# Patient Record
Sex: FEMALE | Race: WHITE | Employment: UNEMPLOYED | URBAN - METROPOLITAN AREA
[De-identification: names, ages, dates, MRNs, and addresses within clinical notes are randomized per-mention and may not be internally consistent; named-entity substitution may affect disease eponyms.]

---

## 2023-09-24 SDOH — HEALTH STABILITY: PHYSICAL HEALTH: ON AVERAGE, HOW MANY DAYS PER WEEK DO YOU ENGAGE IN MODERATE TO STRENUOUS EXERCISE (LIKE A BRISK WALK)?: 1 DAY

## 2023-09-24 SDOH — HEALTH STABILITY: PHYSICAL HEALTH: ON AVERAGE, HOW MANY MINUTES DO YOU ENGAGE IN EXERCISE AT THIS LEVEL?: 40 MIN

## 2023-09-24 ASSESSMENT — SOCIAL DETERMINANTS OF HEALTH (SDOH)
WITHIN THE LAST YEAR, HAVE YOU BEEN AFRAID OF YOUR PARTNER OR EX-PARTNER?: NO
WITHIN THE LAST YEAR, HAVE YOU BEEN KICKED, HIT, SLAPPED, OR OTHERWISE PHYSICALLY HURT BY YOUR PARTNER OR EX-PARTNER?: NO
WITHIN THE LAST YEAR, HAVE YOU BEEN HUMILIATED OR EMOTIONALLY ABUSED IN OTHER WAYS BY YOUR PARTNER OR EX-PARTNER?: NO
WITHIN THE LAST YEAR, HAVE TO BEEN RAPED OR FORCED TO HAVE ANY KIND OF SEXUAL ACTIVITY BY YOUR PARTNER OR EX-PARTNER?: NO

## 2023-09-27 ENCOUNTER — OFFICE VISIT (OUTPATIENT)
Dept: PRIMARY CARE CLINIC | Age: 38
End: 2023-09-27
Payer: OTHER GOVERNMENT

## 2023-09-27 VITALS
SYSTOLIC BLOOD PRESSURE: 122 MMHG | HEIGHT: 64 IN | OXYGEN SATURATION: 99 % | WEIGHT: 138 LBS | HEART RATE: 69 BPM | DIASTOLIC BLOOD PRESSURE: 80 MMHG | BODY MASS INDEX: 23.56 KG/M2 | RESPIRATION RATE: 16 BRPM

## 2023-09-27 DIAGNOSIS — N80.9 ENDOMETRIOSIS DETERMINED BY LAPAROSCOPY: ICD-10-CM

## 2023-09-27 DIAGNOSIS — Z00.00 ANNUAL PHYSICAL EXAM: Primary | ICD-10-CM

## 2023-09-27 DIAGNOSIS — Z12.4 SCREENING FOR CERVICAL CANCER: ICD-10-CM

## 2023-09-27 PROBLEM — D25.0 FIBROIDS, SUBMUCOSAL: Status: ACTIVE | Noted: 2023-09-27

## 2023-09-27 PROCEDURE — 99385 PREV VISIT NEW AGE 18-39: CPT | Performed by: PHYSICIAN ASSISTANT

## 2023-09-27 SDOH — ECONOMIC STABILITY: FOOD INSECURITY: WITHIN THE PAST 12 MONTHS, YOU WORRIED THAT YOUR FOOD WOULD RUN OUT BEFORE YOU GOT MONEY TO BUY MORE.: NEVER TRUE

## 2023-09-27 SDOH — ECONOMIC STABILITY: INCOME INSECURITY: HOW HARD IS IT FOR YOU TO PAY FOR THE VERY BASICS LIKE FOOD, HOUSING, MEDICAL CARE, AND HEATING?: NOT HARD AT ALL

## 2023-09-27 SDOH — ECONOMIC STABILITY: FOOD INSECURITY: WITHIN THE PAST 12 MONTHS, THE FOOD YOU BOUGHT JUST DIDN'T LAST AND YOU DIDN'T HAVE MONEY TO GET MORE.: NEVER TRUE

## 2023-09-27 SDOH — ECONOMIC STABILITY: HOUSING INSECURITY
IN THE LAST 12 MONTHS, WAS THERE A TIME WHEN YOU DID NOT HAVE A STEADY PLACE TO SLEEP OR SLEPT IN A SHELTER (INCLUDING NOW)?: NO

## 2023-09-27 ASSESSMENT — PATIENT HEALTH QUESTIONNAIRE - PHQ9
1. LITTLE INTEREST OR PLEASURE IN DOING THINGS: 0
2. FEELING DOWN, DEPRESSED OR HOPELESS: 0
SUM OF ALL RESPONSES TO PHQ QUESTIONS 1-9: 0
SUM OF ALL RESPONSES TO PHQ9 QUESTIONS 1 & 2: 0

## 2023-09-27 NOTE — PROGRESS NOTES
[address] [facility] PRIMARY CARE  [address] [address]  Dept: [phone]  Dept Fax: [phone]    Karla Patiño is a 43 y.o. female who presents today for her medical conditions/complaints as noted below. Chief Complaint   Patient presents with    Establish Care     Has history of endometriosis on left side and trying for a baby; just moved from [facility]       HPI:     Patient presents to the office to establish care. She is from [facility]. She does not take daily medication for chronic medical illness. Today, patient reports she is doing very well without any new or acute changes. Primary concern is establishing care and obtaining referral.  She would like referral to OB/GYN. Her  are trying to get pregnant. It has been a couple months without success. She does have a history of uterine fibroid, endometriosis. She is taking prenatal vitamin. Her and  preference would be to go to [facility].    Otherwise, denies any issues. She participates in regular physical activity. She is following healthy dietary habits avoiding processed foods. She is vegan outside of eating fish. Blood pressure stable. Weight stable.         No results found for: \"LABA1C\"          ( goal A1C is < 7)   No components found for: \"LABMICR\"  No results found for: \"LDLCHOLESTEROL\", \"LDLCALC\"    (goal LDL is <100)   No results found for: \"AST\", \"ALT\", \"BUN\", \"CR\"  BP Readings from Last 3 Encounters:   09/27/23 122/80          (goal 120/80)    Past Medical History:   Diagnosis Date    Endometriosis       Past Surgical History:   Procedure Laterality Date    UTERINE FIBROID SURGERY         Family History   Problem Relation Age of Onset    Lupus Mother     Cystic Fibrosis Mother     Diabetes Paternal Grandmother     Breast Cancer Maternal Aunt     Prostate Cancer Maternal Uncle        Social History     Tobacco Use    Smoking status: Never    Smokeless tobacco: Never   Substance

## 2023-09-28 ASSESSMENT — ENCOUNTER SYMPTOMS
RHINORRHEA: 0
CONSTIPATION: 0
SINUS PAIN: 0
ABDOMINAL PAIN: 0
COUGH: 0
VOMITING: 0
DIARRHEA: 0
NAUSEA: 0
SHORTNESS OF BREATH: 0
BACK PAIN: 0

## 2024-06-13 NOTE — PROGRESS NOTES
Future     Number of Occurrences:   1     Standing Expiration Date:   6/14/2025    Beta-2 Glycoprotein Antibodies     Standing Status:   Future     Number of Occurrences:   1     Standing Expiration Date:   6/14/2025    Bello (Aliyah) Antibody IgG     Standing Status:   Future     Number of Occurrences:   1     Standing Expiration Date:   6/14/2025    C3 Complement     Standing Status:   Future     Number of Occurrences:   1     Standing Expiration Date:   6/14/2025    C4 Complement     Standing Status:   Future     Number of Occurrences:   1     Standing Expiration Date:   6/14/2025    Lupus Anticoagulant     Standing Status:   Future     Number of Occurrences:   1     Standing Expiration Date:   6/14/2025           I reviewed the above assessment and plan with Deana.  Questions were answered and it appears that the Deana has a good understanding of the visit today.    I would like to see Deana back in No follow-ups on file. , or sooner if any new issues occur.    Electronically signed by Cara Verdugo DO on 6/14/2024 at 12:12 PM

## 2024-06-14 ENCOUNTER — HOSPITAL ENCOUNTER (OUTPATIENT)
Age: 39
Discharge: HOME | End: 2024-06-14
Payer: OTHER GOVERNMENT

## 2024-06-14 ENCOUNTER — OFFICE VISIT (OUTPATIENT)
Dept: PRIMARY CARE CLINIC | Age: 39
End: 2024-06-14
Payer: OTHER GOVERNMENT

## 2024-06-14 VITALS
WEIGHT: 139 LBS | BODY MASS INDEX: 23.73 KG/M2 | SYSTOLIC BLOOD PRESSURE: 106 MMHG | HEIGHT: 64 IN | DIASTOLIC BLOOD PRESSURE: 72 MMHG | HEART RATE: 75 BPM | OXYGEN SATURATION: 99 %

## 2024-06-14 DIAGNOSIS — R21 FACIAL RASH: ICD-10-CM

## 2024-06-14 DIAGNOSIS — M25.50 ARTHRALGIA, UNSPECIFIED JOINT: Primary | ICD-10-CM

## 2024-06-14 DIAGNOSIS — M25.50 ARTHRALGIA, UNSPECIFIED JOINT: ICD-10-CM

## 2024-06-14 DIAGNOSIS — M25.40 JOINT SWELLING: ICD-10-CM

## 2024-06-14 LAB
C3 SERPL-MCNC: 135 MG/DL (ref 90–180)
C4 SERPL-MCNC: 33 MG/DL (ref 10–40)
CARDIOLIPIN IGA SER IA-ACNC: NORMAL APL
CARDIOLIPIN IGG SER IA-ACNC: NORMAL GPL
CARDIOLIPIN IGM SER IA-ACNC: NORMAL MPL
DILUTE RUSSELL VIPER VENOM TIME: NORMAL
INR PPP: 1
LUPUS ANTICOAG: NORMAL
PARTIAL THROMBOPLASTIN TIME: 23.4 SEC (ref 21.3–31.3)
PROTHROMBIN TIME: 10.8 SEC (ref 9.4–12.6)

## 2024-06-14 PROCEDURE — 86235 NUCLEAR ANTIGEN ANTIBODY: CPT

## 2024-06-14 PROCEDURE — 36415 COLL VENOUS BLD VENIPUNCTURE: CPT

## 2024-06-14 PROCEDURE — 85613 RUSSELL VIPER VENOM DILUTED: CPT

## 2024-06-14 PROCEDURE — 85730 THROMBOPLASTIN TIME PARTIAL: CPT

## 2024-06-14 PROCEDURE — 86146 BETA-2 GLYCOPROTEIN ANTIBODY: CPT

## 2024-06-14 PROCEDURE — 85610 PROTHROMBIN TIME: CPT

## 2024-06-14 PROCEDURE — 99214 OFFICE O/P EST MOD 30 MIN: CPT | Performed by: FAMILY MEDICINE

## 2024-06-14 PROCEDURE — 86147 CARDIOLIPIN ANTIBODY EA IG: CPT

## 2024-06-14 PROCEDURE — 86225 DNA ANTIBODY NATIVE: CPT

## 2024-06-14 PROCEDURE — 86160 COMPLEMENT ANTIGEN: CPT

## 2024-06-14 PROCEDURE — 86038 ANTINUCLEAR ANTIBODIES: CPT

## 2024-06-14 RX ORDER — MISOPROSTOL 200 UG/1
TABLET ORAL
COMMUNITY
Start: 2024-02-27 | End: 2024-06-14

## 2024-06-14 ASSESSMENT — PATIENT HEALTH QUESTIONNAIRE - PHQ9
1. LITTLE INTEREST OR PLEASURE IN DOING THINGS: NOT AT ALL
SUM OF ALL RESPONSES TO PHQ QUESTIONS 1-9: 0
2. FEELING DOWN, DEPRESSED OR HOPELESS: NOT AT ALL
SUM OF ALL RESPONSES TO PHQ QUESTIONS 1-9: 0
SUM OF ALL RESPONSES TO PHQ9 QUESTIONS 1 & 2: 0

## 2024-06-17 LAB
ANA SER QL IA: NEGATIVE
B2 GLYCOPROT1 IGA SERPL IA-ACNC: 0.5 ELISA U/ML (ref 0–7)
B2 GLYCOPROT1 IGG SERPL IA-ACNC: <0.6 ELISA U/ML (ref 0–7)
B2 GLYCOPROT1 IGM SERPL IA-ACNC: <0.9 ELISA U/ML (ref 0–7)
DSDNA IGG SER QL IA: <0.5 IU/ML
NUCLEAR IGG SER IA-RTO: <0.1 U/ML

## 2024-06-19 LAB
CARDIOLIPIN IGA SER IA-ACNC: 1.3 APL (ref 0–14)
CARDIOLIPIN IGG SER IA-ACNC: <0.5 GPL (ref 0–10)
CARDIOLIPIN IGM SER IA-ACNC: 2.2 MPL (ref 0–10)
DILUTE RUSSELL VIPER VENOM TIME: NORMAL
ENA SM AB SER-ACNC: <0.8 U/ML
INR PPP: 1
PARTIAL THROMBOPLASTIN TIME: 23.4 SEC (ref 21.3–31.3)
PROTHROMBIN TIME: 10.8 SEC (ref 9.4–12.6)

## 2025-03-21 ENCOUNTER — OFFICE VISIT (OUTPATIENT)
Dept: PRIMARY CARE CLINIC | Age: 40
End: 2025-03-21
Payer: OTHER GOVERNMENT

## 2025-03-21 ENCOUNTER — HOSPITAL ENCOUNTER (OUTPATIENT)
Age: 40
Setting detail: SPECIMEN
Discharge: HOME | End: 2025-03-21

## 2025-03-21 VITALS
SYSTOLIC BLOOD PRESSURE: 124 MMHG | DIASTOLIC BLOOD PRESSURE: 78 MMHG | BODY MASS INDEX: 25.16 KG/M2 | WEIGHT: 147.4 LBS | HEART RATE: 81 BPM | OXYGEN SATURATION: 98 % | HEIGHT: 64 IN

## 2025-03-21 DIAGNOSIS — R35.0 URINARY FREQUENCY: ICD-10-CM

## 2025-03-21 DIAGNOSIS — R42 VERTIGO: ICD-10-CM

## 2025-03-21 DIAGNOSIS — R35.0 URINARY FREQUENCY: Primary | ICD-10-CM

## 2025-03-21 LAB
BILIRUBIN, POC: NORMAL
BLOOD URINE, POC: NORMAL
CLARITY, POC: CLEAR
COLOR, POC: NORMAL
GLUCOSE URINE, POC: NORMAL MG/DL
KETONES, POC: NORMAL MG/DL
LEUKOCYTE EST, POC: NORMAL
NITRITE, POC: NORMAL
PH, POC: 6
PROTEIN, POC: 15 MG/DL
SPECIFIC GRAVITY, POC: 1
UROBILINOGEN, POC: 0.2 MG/DL

## 2025-03-21 PROCEDURE — 81002 URINALYSIS NONAUTO W/O SCOPE: CPT | Performed by: PHYSICIAN ASSISTANT

## 2025-03-21 PROCEDURE — 99214 OFFICE O/P EST MOD 30 MIN: CPT | Performed by: PHYSICIAN ASSISTANT

## 2025-03-21 RX ORDER — CEPHALEXIN 500 MG/1
500 CAPSULE ORAL 2 TIMES DAILY
Qty: 14 CAPSULE | Refills: 0 | Status: SHIPPED | OUTPATIENT
Start: 2025-03-21 | End: 2025-03-28

## 2025-03-21 SDOH — ECONOMIC STABILITY: FOOD INSECURITY: WITHIN THE PAST 12 MONTHS, THE FOOD YOU BOUGHT JUST DIDN'T LAST AND YOU DIDN'T HAVE MONEY TO GET MORE.: NEVER TRUE

## 2025-03-21 SDOH — ECONOMIC STABILITY: FOOD INSECURITY: WITHIN THE PAST 12 MONTHS, YOU WORRIED THAT YOUR FOOD WOULD RUN OUT BEFORE YOU GOT MONEY TO BUY MORE.: NEVER TRUE

## 2025-03-21 ASSESSMENT — PATIENT HEALTH QUESTIONNAIRE - PHQ9
2. FEELING DOWN, DEPRESSED OR HOPELESS: NOT AT ALL
SUM OF ALL RESPONSES TO PHQ QUESTIONS 1-9: 0
1. LITTLE INTEREST OR PLEASURE IN DOING THINGS: NOT AT ALL
SUM OF ALL RESPONSES TO PHQ QUESTIONS 1-9: 0

## 2025-03-21 NOTE — PROGRESS NOTES
[facility]  [facility] PRIMARY CARE  [address] [place] [id number]  Dept: [phone]  Dept Fax: [phone]    Deana Avalos is a 39 y.o. female who presents today for her medical conditions/complaints as noted below.    Chief Complaint   Patient presents with    Urinary Frequency     Patient is in office with C/O Urinary frequency. Patient states that it started about a month ago. Patient is currently on her menstrual cycle. Patient states that she is going about 15 x per day and 3-5 times per night.    Other     Patient states that she is having vertigo sx and have been going on for about a year.        HPI:     Presents to the office for couple concerns.  She reports increase in urinary frequency.  This has been ongoing for about a month.  Denies any chance of pregnancy and is currently on her menstrual cycle.  She has dealt with 2 miscarriages in the last several months.  She follows routinely with OB/GYN.  Patient reports that she is urinating 10-15 times per day and a few times per night.  Sometimes has some hesitancy associated.  Denies any dysuria.  No history of STI.  No blood in the urine.  No flank pain or stomach pain.  She does admit to a few caffeinated beverage per day and several bottles of water per day.  We discussed her fluid intake and outputs.    Also, patient reports intermittent vertigo over the last year.  When she was pregnant temporarily before miscarriages her vertigo symptoms resolved.  Vertigo is intermittent.  Typically when changing posture/head movements when lying down.  She feels that the room is spinning.  Denies any vomiting.  No lightheadedness when standing or exercising.  No prior evaluation.    No other acute changes.  BP stable.        No results found for: \"LABA1C\"          ( goal A1C is < 7)   No components found for: \"LABMICR\"  No components found for: \"LDLCHOLESTEROL\", \"LDLCALC\"    (goal LDL is <100)   No results found for: \"AST\", \"ALT\", \"BUN\",

## 2025-03-22 LAB
MICROORGANISM SPEC CULT: ABNORMAL
SERVICE CMNT-IMP: ABNORMAL
SPECIMEN DESCRIPTION: ABNORMAL

## 2025-03-26 ENCOUNTER — HOSPITAL ENCOUNTER (OUTPATIENT)
Age: 40
Setting detail: SPECIMEN
Discharge: HOME | End: 2025-03-26

## 2025-03-26 DIAGNOSIS — R35.0 URINARY FREQUENCY: ICD-10-CM

## 2025-03-26 LAB
ALBUMIN SERPL-MCNC: 4.7 G/DL (ref 3.5–5.2)
ALBUMIN/GLOB SERPL: 1.9 {RATIO} (ref 1–2.5)
ALP SERPL-CCNC: 78 U/L (ref 35–104)
ALT SERPL-CCNC: 10 U/L (ref 10–35)
ANION GAP SERPL CALCULATED.3IONS-SCNC: 11 MMOL/L (ref 9–16)
AST SERPL-CCNC: 17 U/L (ref 10–35)
BILIRUB SERPL-MCNC: 0.6 MG/DL (ref 0–1.2)
BUN SERPL-MCNC: 12 MG/DL (ref 6–20)
CALCIUM SERPL-MCNC: 9.6 MG/DL (ref 8.6–10.4)
CHLORIDE SERPL-SCNC: 105 MMOL/L (ref 98–107)
CO2 SERPL-SCNC: 24 MMOL/L (ref 20–31)
CREAT SERPL-MCNC: 0.6 MG/DL (ref 0.6–0.9)
GFR, ESTIMATED: >90 ML/MIN/1.73M2
GLUCOSE P FAST SERPL-MCNC: 81 MG/DL (ref 74–99)
POTASSIUM SERPL-SCNC: 4.5 MMOL/L (ref 3.7–5.3)
PROT SERPL-MCNC: 7.2 G/DL (ref 6.6–8.7)
SODIUM SERPL-SCNC: 140 MMOL/L (ref 136–145)
TSH SERPL DL<=0.05 MIU/L-ACNC: 1.1 UIU/ML (ref 0.27–4.2)

## 2025-03-26 ASSESSMENT — ENCOUNTER SYMPTOMS
COUGH: 0
VOMITING: 0
SHORTNESS OF BREATH: 0
CONSTIPATION: 0
SINUS PAIN: 0
DIARRHEA: 0
ABDOMINAL PAIN: 0
NAUSEA: 0
BACK PAIN: 0
RHINORRHEA: 0

## 2025-04-08 ENCOUNTER — HOSPITAL ENCOUNTER (OUTPATIENT)
Age: 40
Setting detail: THERAPIES SERIES
Discharge: HOME | End: 2025-04-08
Payer: OTHER GOVERNMENT

## 2025-04-08 PROCEDURE — 97161 PT EVAL LOW COMPLEX 20 MIN: CPT

## 2025-04-08 PROCEDURE — 95992 CANALITH REPOSITIONING PROC: CPT

## 2025-04-08 NOTE — CONSULTS
Demonstrates understanding.  [x] Needs Review.  [x] Demonstrates/verbalizes understanding of HEP/Ed previously given.   Vestibular anatomy and function  Sleep modifications to include: Maintain head elevated 30 degrees, avoid  L sidelying    Specific Instructions for next treatment: Reassess positionals, continue CRT, issue HEP as appropriate    Evaluation Complexity:  History (Personal factors, comorbidities) [x] 0 [] 1-2 [] 3+   Exam (limitations, restrictions) [] 1-2 [] 3 [x] 4+   Clinical presentation (progression) [x] Stable [] Evolving  [] Unstable   Decision Making [x] Low [] Moderate [] High     [x] Low Complexity [] Moderate Complexity [] High Complexity     Today's Treatment Charges        Mins       Units   [x] PT Evaluation- [x] Low; [] Moderate; [] High  30 1   [x] Canalith repositioning maneuver/technique (CRM/T) 10 1   [] Neuroreeed 15 min     [] Therapeutic activity 15 min       TOTAL TREATMENT TIME:     Start Time:1415             Stop Time:1505     More objective information is available upon request.  Thank you for this referral.    Electronically signed by: Arabella Barksdale PT    Physician Signature:________________________________Date:__________________  By signing above or cosigning this note, I have reviewed this plan of care and certify a need for medically necessary rehabilitation services.      *PLEASE SIGN ABOVE AND FAX BACK ALL PAGES*

## 2025-04-15 ENCOUNTER — HOSPITAL ENCOUNTER (OUTPATIENT)
Age: 40
Setting detail: THERAPIES SERIES
Discharge: HOME | End: 2025-04-15
Payer: OTHER GOVERNMENT

## 2025-04-15 PROCEDURE — 95992 CANALITH REPOSITIONING PROC: CPT

## 2025-04-15 PROCEDURE — 97530 THERAPEUTIC ACTIVITIES: CPT

## 2025-04-15 NOTE — FLOWSHEET NOTE
[x] [facility]  Outpatient Rehabilitation &  Therapy  [address] [facility]  P: [phone]  F: [phone]     Physical Therapy Daily Treatment Note    Date:  4/15/2025  Patient Name:  Deana Avalos    :  1985  MRN: [id number]  Physician: Willie Looney PA-C                         Insurance:  east: 20% coinsurance  Medical Diagnosis: R42                   Rehab Codes: R42, H81.12, R26.81  Onset date: 3/2024                Next 's appt.: TBD  Visit# / total visits:     Cancels/No Shows: 0/0    Subjective:  Reports intermittent tripping RLE. Had episode off LLE edema undiagnosed in ER. (-) blood clot. Still stiff L knee  Pain:  [] Yes  [x] No Location: N/A Pain Rating: (0-10 scale) 0/10  Pain altered Tx:  [] No  [] Yes  Action:  Comments:    Objective: L iliac crest high, L ASIS high: L upslip  Modalities:   Precautions [] No  [x] Yes: Precautions: low fall risk  Positional Tests:     Test Nystagmus Duration Symptoms   Linda Hallpike- R (-) brief dizziness   Allenton Hallpike- L UBN L torsion Short, delayed vertigo   Modified S/L Test- R         Modified S/L Test- L         Roll Test            Positional Treatments:     BPPV Type Treatment Technique Trials Result Other:   L posterior canalithiasis Modified CRT for L posterior canalithiasis X 2 Abolished vertigo CGA     R sidelying to sit X 2   CGA       Exercises:  Exercise Reps/ Time Weight/ Level Comments   L upslip/LLE distraction 3 min  Upslip corrected, level pelvis                                                                                                                     Other:         Treatment Charges: Mins Units   []  Modalities       []  Ther Exercise     []  Neuromuscular Re-ed     []  Gait Training     [x]  Manual Therapy 3 0   [x]  Ther Activities 10 1   []  Aquatics     []  Vasocompression     []  Cervical Traction     [x]  Other CRT 25 1   Total Billable time 13 min           Assessment:  Patient presents with